# Patient Record
Sex: MALE | Race: WHITE
[De-identification: names, ages, dates, MRNs, and addresses within clinical notes are randomized per-mention and may not be internally consistent; named-entity substitution may affect disease eponyms.]

---

## 2018-09-28 ENCOUNTER — HOSPITAL ENCOUNTER (OUTPATIENT)
Dept: HOSPITAL 74 - JASU-SURG | Age: 33
Discharge: HOME | End: 2018-09-28
Attending: SURGERY
Payer: COMMERCIAL

## 2018-09-28 VITALS — BODY MASS INDEX: 27.1 KG/M2

## 2018-09-28 VITALS — DIASTOLIC BLOOD PRESSURE: 66 MMHG | TEMPERATURE: 98.6 F | HEART RATE: 84 BPM | SYSTOLIC BLOOD PRESSURE: 139 MMHG

## 2018-09-28 DIAGNOSIS — K80.20: Primary | ICD-10-CM

## 2018-09-28 DIAGNOSIS — K42.9: ICD-10-CM

## 2018-09-28 PROCEDURE — 0WQF0ZZ REPAIR ABDOMINAL WALL, OPEN APPROACH: ICD-10-PCS | Performed by: SURGERY

## 2018-09-28 PROCEDURE — 0FT44ZZ RESECTION OF GALLBLADDER, PERCUTANEOUS ENDOSCOPIC APPROACH: ICD-10-PCS | Performed by: SURGERY

## 2018-09-28 PROCEDURE — BF03YZZ PLAIN RADIOGRAPHY OF GALLBLADDER AND BILE DUCTS USING OTHER CONTRAST: ICD-10-PCS | Performed by: SURGERY

## 2018-09-28 NOTE — SURG
Surgery First Assist Note


First Assist: Ally Teran PA-C


Date of Service: 09/28/18


Diagnosis: 





cholelithesis


Procedure: 





laparscopic cholecystectomy, itra-operative cholangiogram, repair of umbilical 

hernia


I was present for the entirety of the operative procedure. For further detail, 

please refer to operative report.








Visit type





- Case Type


Case Type: Scheduled





- Emergency


Emergency Visit: No





- New patient


This patient is new to me today: Yes


Date on this admission: 09/28/18

## 2018-09-29 NOTE — OP
DATE OF OPERATION:  09/28/2018

 

SURGICAL ATTENDING:  Genet Harper MD

 

ASSISTANT:  JHONNY Barksdale

 

PREOPERATIVE DIAGNOSES:

1.  Gallstones.

2.  Umbilical hernia.

 

POSTOPERATIVE DIAGNOSES:

1.  Gallstones.

2.  Umbilical hernia.

 

ANESTHESIA:  General endotracheal.

 

PROCEDURE:  

1.  Laparoscopic cholecystectomy with intraoperative cholangiogram.

2.  Umbilical hernia repair.

 

DESCRIPTION OF PROCEDURE:  The patient was taken into the operating room, placed in a

supine position, endotracheally intubated, prepped and draped in the usual sterile

fashion.

 

A supraumbilical curvilinear incision was made and carried down to subcutaneous

tissues.  A piece of herniated fat was identified, dissected free from surrounding

tissues, and reduced into the peritoneal cavity.  The peritoneal lining was

identified and palpated and isolated.  A Refugio trocar was then placed through this

umbilical hernia defect and the abdomen insufflated to 15 mmHg of CO2.  Laparoscopy

identified no injury and the gallbladder was identified.  No other abnormality was

seen.  A midepigastric and 2 right lateral 5-mm ports were placed under direct

vision.

 

The gallbladder was then grasped and retracted superiorly, anteriorly, and laterally.

 The cystic artery was identified, doubly-clipped, and cut.  The cystic duct was

identified, dissected out, and a ductotomy was made.  A cholangiogram catheter was

placed into the duct, balloon inflated, and dye was injected.  This showed excellent

filling of the cystic duct, the common bile duct, and the common hepatic duct and

right and left intrahepatic ducts.  It also showed good filling into the small bowel

with no defects.

 

The cystic duct was then doubly-clipped and cut.  The gallbladder was then retracted

off of the liver bed and dissected with electrocautery.  It was then placed into an

EndoCatch bag and removed.  The abdomen was then irrigated and suctioned.  No leakage

of bile and no bleeding were seen.  The umbilical defect was enlarged slightly in

order to remove the gallbladder in the EndoCatch bag.

 

At this point, the umbilical defect was then repaired using interrupted 0 Vicryl

sutures.  All skin incisions were closed with Monocryl and Dermabond was placed.

 

The patient was then awakened, extubated, and taken to recovery in stable condition.

 

Dr. Harper, the attending surgeon, was present throughout the entire procedure. 

 

 

GENET HARPER M.D.

 

[id number]

DD: 09/28/2018 17:33

DT: 09/29/2018 14:49

Job #:  [id number]

## 2018-10-04 NOTE — PATH
Surgical Pathology Report



Patient Name:  ANGIE RICE

Accession #:  [id number]

Med. Rec. #:  [id number]                                                        

   /Age/Gender:  1985 (Age: 32) / M

Account:  [id number]                                                          

             Location: [facility]

Taken:  2018

Received:  10/1/2018

Reported:  10/4/2018

Physicians:  Luis Miguel Aggarwal M.D.

  



Specimen(s) Received

 GALLBLADDER 





Clinical History

Cholelithiasis 







Final Diagnosis

GALLBLADDER, CHOLECYSTECTOMY:

CHRONIC CHOLECYSTITIS AND CHOLELITHIASIS.





***Electronically Signed***

Sylvie Santana M.D.





Gross Description

Received in formalin, labeled "gallbladder," is a 9.0 x 2.5 x 2.2 cm.

gallbladder with a 0.2 cm. in length portion of cystic duct attached. The outer

surface varies from smooth to shaggy. The lumen contains with bile and one stone

measuring 3.2 cm in greatest dimension. The mucosa is focally superficial

eroded. The wall of the gallbladder measures 0.2cm. in thickness. Representative

sections are submitted in one cassette.  

MAURO/10/1/2018



jules/10/1/2018

## 2020-07-21 NOTE — HP
History & Physical Update





- History


History: No Change





- Physical


Physical: No Change





- Assessment


Assessment: No Change





- Plan


Plan: No Change (full H&P in chart from 9/13/2018.)
21-Jul-2020 09:25

## 2025-04-28 NOTE — OP
Operative Note





- Note:


Operative Date: 09/28/18


Pre-Operative Diagnosis: cholelithesis


Operation: laparoscopic cholecystecotmy with intra-operative cholangiogram


Surgeon: Luis Miguel Aggarwal


Assistant: Ally Teran


Anesthesia: General


Specimens Removed: gallbladder


Estimated Blood Loss (mls): 50


Fluid Volume Replaced (mls): 1,600


Operative Report Dictated: Yes Pt came for PT iNR    INR 2.4    5mg Tues/ Thurs  6mg  EOD